# Patient Record
Sex: MALE | Race: WHITE | NOT HISPANIC OR LATINO | URBAN - METROPOLITAN AREA
[De-identification: names, ages, dates, MRNs, and addresses within clinical notes are randomized per-mention and may not be internally consistent; named-entity substitution may affect disease eponyms.]

---
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
## 2024-01-04 NOTE — PROCEDURE: HIGH RISK MEDICATION MONITORING
coffee Patient requests all Lab, Cardiology, and Radiology Results on their Discharge Instructions Libtayo Counseling- I discussed with the patient the risks of Libtayo including but not limited to nausea, vomiting, diarrhea, and bone or muscle pain.  The patient verbalized understanding of the proper use and possible adverse effects of Libtayo.  All of the patient's questions and concerns were addressed.

## 2024-03-24 NOTE — PROCEDURE: HIGH RISK MEDICATION MONITORING
30 Prednisone Pregnancy And Lactation Text: This medication is Pregnancy Category C and it isn't know if it is safe during pregnancy. This medication is excreted in breast milk.

## 2024-09-13 NOTE — PROCEDURE: HIGH RISK MEDICATION MONITORING
HR=65 bpm, VPQL=302/78 mmhg, SpO2=92.0 %, Resp=22 B/min, EtCO2=37 mmHg, Apnea=3 Seconds, Pain=0, Tomas=2 Rifampin Counseling: I discussed with the patient the risks of rifampin including but not limited to liver damage, kidney damage, red-orange body fluids, nausea/vomiting and severe allergy.

## 2024-11-08 NOTE — PROCEDURE: HIGH RISK MEDICATION MONITORING
9 Metronidazole Pregnancy And Lactation Text: This medication is Pregnancy Category B and considered safe during pregnancy.  It is also excreted in breast milk.